# Patient Record
Sex: FEMALE | Race: WHITE
[De-identification: names, ages, dates, MRNs, and addresses within clinical notes are randomized per-mention and may not be internally consistent; named-entity substitution may affect disease eponyms.]

---

## 2023-05-10 ENCOUNTER — APPOINTMENT (OUTPATIENT)
Dept: OBGYN | Facility: CLINIC | Age: 23
End: 2023-05-10
Payer: MEDICAID

## 2023-05-10 VITALS — WEIGHT: 145 LBS | TEMPERATURE: 97.8 F | HEIGHT: 67 IN | BODY MASS INDEX: 22.76 KG/M2

## 2023-05-10 DIAGNOSIS — N92.6 IRREGULAR MENSTRUATION, UNSPECIFIED: ICD-10-CM

## 2023-05-10 PROBLEM — Z00.00 ENCOUNTER FOR PREVENTIVE HEALTH EXAMINATION: Status: ACTIVE | Noted: 2023-05-10

## 2023-05-10 PROCEDURE — 76830 TRANSVAGINAL US NON-OB: CPT

## 2023-05-10 PROCEDURE — 81025 URINE PREGNANCY TEST: CPT

## 2023-05-10 PROCEDURE — 99204 OFFICE O/P NEW MOD 45 MIN: CPT

## 2023-05-13 PROBLEM — N92.6 IRREGULAR MENSES: Status: ACTIVE | Noted: 2023-05-13

## 2023-06-23 ENCOUNTER — NON-APPOINTMENT (OUTPATIENT)
Age: 23
End: 2023-06-23

## 2023-06-27 ENCOUNTER — NON-APPOINTMENT (OUTPATIENT)
Age: 23
End: 2023-06-27

## 2023-06-27 ENCOUNTER — APPOINTMENT (OUTPATIENT)
Dept: OBGYN | Facility: CLINIC | Age: 23
End: 2023-06-27
Payer: MEDICAID

## 2023-06-27 VITALS — TEMPERATURE: 98.2 F | WEIGHT: 145 LBS | HEIGHT: 67 IN | BODY MASS INDEX: 22.76 KG/M2

## 2023-06-27 PROCEDURE — 76830 TRANSVAGINAL US NON-OB: CPT

## 2023-06-27 PROCEDURE — 81025 URINE PREGNANCY TEST: CPT

## 2023-06-27 PROCEDURE — 99213 OFFICE O/P EST LOW 20 MIN: CPT

## 2023-06-29 ENCOUNTER — NON-APPOINTMENT (OUTPATIENT)
Age: 23
End: 2023-06-29

## 2023-07-10 ENCOUNTER — NON-APPOINTMENT (OUTPATIENT)
Age: 23
End: 2023-07-10

## 2023-07-13 ENCOUNTER — APPOINTMENT (OUTPATIENT)
Dept: OBGYN | Facility: CLINIC | Age: 23
End: 2023-07-13
Payer: MEDICAID

## 2023-07-13 VITALS — BODY MASS INDEX: 22.76 KG/M2 | HEIGHT: 67 IN | WEIGHT: 145 LBS | TEMPERATURE: 98.2 F

## 2023-07-13 VITALS — WEIGHT: 140 LBS | BODY MASS INDEX: 27.48 KG/M2 | TEMPERATURE: 98 F | HEIGHT: 60 IN

## 2023-07-13 DIAGNOSIS — N91.1 SECONDARY AMENORRHEA: ICD-10-CM

## 2023-07-13 PROCEDURE — 99212 OFFICE O/P EST SF 10 MIN: CPT

## 2023-07-13 PROCEDURE — 76830 TRANSVAGINAL US NON-OB: CPT

## 2023-08-10 ENCOUNTER — APPOINTMENT (OUTPATIENT)
Dept: OBGYN | Facility: CLINIC | Age: 23
End: 2023-08-10

## 2024-02-28 ENCOUNTER — INPATIENT (INPATIENT)
Facility: HOSPITAL | Age: 24
LOS: 1 days | Discharge: HOME | DRG: 560 | End: 2024-03-01
Attending: OBSTETRICS & GYNECOLOGY | Admitting: OBSTETRICS & GYNECOLOGY
Payer: MEDICAID

## 2024-02-28 VITALS — DIASTOLIC BLOOD PRESSURE: 85 MMHG | HEART RATE: 101 BPM | SYSTOLIC BLOOD PRESSURE: 133 MMHG

## 2024-02-28 DIAGNOSIS — O26.899 OTHER SPECIFIED PREGNANCY RELATED CONDITIONS, UNSPECIFIED TRIMESTER: ICD-10-CM

## 2024-02-28 LAB
BASOPHILS # BLD AUTO: 0.03 K/UL — SIGNIFICANT CHANGE UP (ref 0–0.2)
BASOPHILS NFR BLD AUTO: 0.2 % — SIGNIFICANT CHANGE UP (ref 0–1)
EOSINOPHIL # BLD AUTO: 0.02 K/UL — SIGNIFICANT CHANGE UP (ref 0–0.7)
EOSINOPHIL NFR BLD AUTO: 0.2 % — SIGNIFICANT CHANGE UP (ref 0–8)
HCT VFR BLD CALC: 38.2 % — SIGNIFICANT CHANGE UP (ref 37–47)
HGB BLD-MCNC: 13.2 G/DL — SIGNIFICANT CHANGE UP (ref 12–16)
IMM GRANULOCYTES NFR BLD AUTO: 0.9 % — HIGH (ref 0.1–0.3)
LYMPHOCYTES # BLD AUTO: 1.7 K/UL — SIGNIFICANT CHANGE UP (ref 1.2–3.4)
LYMPHOCYTES # BLD AUTO: 13.4 % — LOW (ref 20.5–51.1)
MCHC RBC-ENTMCNC: 31.9 PG — HIGH (ref 27–31)
MCHC RBC-ENTMCNC: 34.6 G/DL — SIGNIFICANT CHANGE UP (ref 32–37)
MCV RBC AUTO: 92.3 FL — SIGNIFICANT CHANGE UP (ref 81–99)
MONOCYTES # BLD AUTO: 0.71 K/UL — HIGH (ref 0.1–0.6)
MONOCYTES NFR BLD AUTO: 5.6 % — SIGNIFICANT CHANGE UP (ref 1.7–9.3)
NEUTROPHILS # BLD AUTO: 10.09 K/UL — HIGH (ref 1.4–6.5)
NEUTROPHILS NFR BLD AUTO: 79.7 % — HIGH (ref 42.2–75.2)
NRBC # BLD: 0 /100 WBCS — SIGNIFICANT CHANGE UP (ref 0–0)
PLATELET # BLD AUTO: 172 K/UL — SIGNIFICANT CHANGE UP (ref 130–400)
PMV BLD: 12.8 FL — HIGH (ref 7.4–10.4)
PRENATAL SYPHILIS TEST: SIGNIFICANT CHANGE UP
RBC # BLD: 4.14 M/UL — LOW (ref 4.2–5.4)
RBC # FLD: 13.1 % — SIGNIFICANT CHANGE UP (ref 11.5–14.5)
WBC # BLD: 12.67 K/UL — HIGH (ref 4.8–10.8)
WBC # FLD AUTO: 12.67 K/UL — HIGH (ref 4.8–10.8)

## 2024-02-28 PROCEDURE — 36415 COLL VENOUS BLD VENIPUNCTURE: CPT

## 2024-02-28 PROCEDURE — 59050 FETAL MONITOR W/REPORT: CPT

## 2024-02-28 PROCEDURE — 86592 SYPHILIS TEST NON-TREP QUAL: CPT

## 2024-02-28 PROCEDURE — 85025 COMPLETE CBC W/AUTO DIFF WBC: CPT

## 2024-02-28 PROCEDURE — 86850 RBC ANTIBODY SCREEN: CPT

## 2024-02-28 PROCEDURE — 86900 BLOOD TYPING SEROLOGIC ABO: CPT

## 2024-02-28 PROCEDURE — 86901 BLOOD TYPING SEROLOGIC RH(D): CPT

## 2024-02-28 RX ORDER — OXYTOCIN 10 UNIT/ML
333.33 VIAL (ML) INJECTION
Qty: 20 | Refills: 0 | Status: DISCONTINUED | OUTPATIENT
Start: 2024-02-28 | End: 2024-03-01

## 2024-02-28 RX ORDER — OXYTOCIN 10 UNIT/ML
2 VIAL (ML) INJECTION
Qty: 30 | Refills: 0 | Status: DISCONTINUED | OUTPATIENT
Start: 2024-02-28 | End: 2024-03-01

## 2024-02-28 RX ORDER — SODIUM CHLORIDE 9 MG/ML
1000 INJECTION, SOLUTION INTRAVENOUS
Refills: 0 | Status: DISCONTINUED | OUTPATIENT
Start: 2024-02-28 | End: 2024-02-29

## 2024-02-28 RX ORDER — INFLUENZA VIRUS VACCINE 15; 15; 15; 15 UG/.5ML; UG/.5ML; UG/.5ML; UG/.5ML
0.5 SUSPENSION INTRAMUSCULAR ONCE
Refills: 0 | Status: COMPLETED | OUTPATIENT
Start: 2024-02-28 | End: 2024-02-28

## 2024-02-28 RX ADMIN — Medication 2 MILLIUNIT(S)/MIN: at 16:59

## 2024-02-28 NOTE — OB PROVIDER H&P - HISTORY OF PRESENT ILLNESS
23y  @ 39weeks 6 days ROOPA: 24 consistent with early sono, presents for PROM. Patient states she felt gush of fluid at 1am clear. Pt admits vaginal leaking throughout the day. Pt states her panty was soaked in triage.  Denies VB, LOF, and ctx. +FM. No complications with this pregnancy. Denies any hx of HTN or GDM. Denies headaches, visual changes, nausea, vomiting, or RUQ pain. Denies hx of PPH or blood transfusion. GBS (neg).

## 2024-02-28 NOTE — OB PROVIDER H&P - NSHPPHYSICALEXAM_GEN_ALL_CORE
T(C): 36.5 (02-28-24 @ 14:20), Max: 36.5 (02-28-24 @ 14:00)  HR: 101 (02-28-24 @ 14:20) (101 - 113)  BP: 133/85 (02-28-24 @ 14:20) (133/85 - 133/85)  RR: 18 (02-28-24 @ 14:20) (18 - 18)      EFM: 145 bpm, moderate variability, +accels  TOCO: irreg ctxs    Abd: soft, gravid, nontender

## 2024-02-28 NOTE — OB PROVIDER H&P - NSHPLABSRESULTS_GEN_ALL_CORE
1st trimester labs  7/19/23  HIV :neg  RPR: neg  Varicella: imm  Measles: imm  Rubella: non imm  Hep B: neg  Rh: A pos  antibody: neg    3rd trimester: 2/7/24  HIV: neg   GBS: neg

## 2024-02-28 NOTE — OB PROVIDER H&P - ASSESSMENT
23y  @ 39weeks 6 days, GBS neg, admitted for PROM clear since 1am    -Admit to L & D:  -IV hydration  -Admission labs  -Continuous EFM & TOCO monitoring  -Clear Liquid diet  -MMR PP  -Pain Management PRN  -IOL w/Pitocin    Dr. Sanders aware

## 2024-02-28 NOTE — OB PROVIDER H&P - ATTENDING COMMENTS
24yo  at 39w6d gestation who presented to L&D with SROM  SROM at 0100 on 24  Not feeling contractions  GBS negative  Admit to L&D  Pitocin for inductions/augmentation of labor  Epidural if desired for pain control  Anticipate vaginal delivery

## 2024-02-29 LAB
BASOPHILS # BLD AUTO: 0.04 K/UL — SIGNIFICANT CHANGE UP (ref 0–0.2)
BASOPHILS NFR BLD AUTO: 0.2 % — SIGNIFICANT CHANGE UP (ref 0–1)
EOSINOPHIL # BLD AUTO: 0.01 K/UL — SIGNIFICANT CHANGE UP (ref 0–0.7)
EOSINOPHIL NFR BLD AUTO: 0 % — SIGNIFICANT CHANGE UP (ref 0–8)
HCT VFR BLD CALC: 34.6 % — LOW (ref 37–47)
HGB BLD-MCNC: 11.8 G/DL — LOW (ref 12–16)
IMM GRANULOCYTES NFR BLD AUTO: 0.8 % — HIGH (ref 0.1–0.3)
LYMPHOCYTES # BLD AUTO: 1.59 K/UL — SIGNIFICANT CHANGE UP (ref 1.2–3.4)
LYMPHOCYTES # BLD AUTO: 7.7 % — LOW (ref 20.5–51.1)
MCHC RBC-ENTMCNC: 31.3 PG — HIGH (ref 27–31)
MCHC RBC-ENTMCNC: 34.1 G/DL — SIGNIFICANT CHANGE UP (ref 32–37)
MCV RBC AUTO: 91.8 FL — SIGNIFICANT CHANGE UP (ref 81–99)
MONOCYTES # BLD AUTO: 1.26 K/UL — HIGH (ref 0.1–0.6)
MONOCYTES NFR BLD AUTO: 6.1 % — SIGNIFICANT CHANGE UP (ref 1.7–9.3)
NEUTROPHILS # BLD AUTO: 17.52 K/UL — HIGH (ref 1.4–6.5)
NEUTROPHILS NFR BLD AUTO: 85.2 % — HIGH (ref 42.2–75.2)
NRBC # BLD: 0 /100 WBCS — SIGNIFICANT CHANGE UP (ref 0–0)
PLATELET # BLD AUTO: 157 K/UL — SIGNIFICANT CHANGE UP (ref 130–400)
PMV BLD: 12.6 FL — HIGH (ref 7.4–10.4)
RBC # BLD: 3.77 M/UL — LOW (ref 4.2–5.4)
RBC # FLD: 13 % — SIGNIFICANT CHANGE UP (ref 11.5–14.5)
WBC # BLD: 20.58 K/UL — HIGH (ref 4.8–10.8)
WBC # FLD AUTO: 20.58 K/UL — HIGH (ref 4.8–10.8)

## 2024-02-29 RX ORDER — BENZOCAINE 10 %
1 GEL (GRAM) MUCOUS MEMBRANE EVERY 6 HOURS
Refills: 0 | Status: DISCONTINUED | OUTPATIENT
Start: 2024-02-29 | End: 2024-03-01

## 2024-02-29 RX ORDER — OXYCODONE HYDROCHLORIDE 5 MG/1
5 TABLET ORAL ONCE
Refills: 0 | Status: DISCONTINUED | OUTPATIENT
Start: 2024-02-29 | End: 2024-03-01

## 2024-02-29 RX ORDER — MAGNESIUM HYDROXIDE 400 MG/1
30 TABLET, CHEWABLE ORAL
Refills: 0 | Status: DISCONTINUED | OUTPATIENT
Start: 2024-02-29 | End: 2024-03-01

## 2024-02-29 RX ORDER — IBUPROFEN 200 MG
600 TABLET ORAL EVERY 6 HOURS
Refills: 0 | Status: DISCONTINUED | OUTPATIENT
Start: 2024-02-29 | End: 2024-03-01

## 2024-02-29 RX ORDER — DIPHENHYDRAMINE HCL 50 MG
25 CAPSULE ORAL EVERY 6 HOURS
Refills: 0 | Status: DISCONTINUED | OUTPATIENT
Start: 2024-02-29 | End: 2024-03-01

## 2024-02-29 RX ORDER — DIBUCAINE 1 %
1 OINTMENT (GRAM) RECTAL EVERY 6 HOURS
Refills: 0 | Status: DISCONTINUED | OUTPATIENT
Start: 2024-02-29 | End: 2024-03-01

## 2024-02-29 RX ORDER — OXYTOCIN 10 UNIT/ML
41.67 VIAL (ML) INJECTION
Qty: 20 | Refills: 0 | Status: DISCONTINUED | OUTPATIENT
Start: 2024-02-29 | End: 2024-03-01

## 2024-02-29 RX ORDER — SIMETHICONE 80 MG/1
80 TABLET, CHEWABLE ORAL EVERY 4 HOURS
Refills: 0 | Status: DISCONTINUED | OUTPATIENT
Start: 2024-02-29 | End: 2024-03-01

## 2024-02-29 RX ORDER — KETOROLAC TROMETHAMINE 30 MG/ML
30 SYRINGE (ML) INJECTION ONCE
Refills: 0 | Status: DISCONTINUED | OUTPATIENT
Start: 2024-02-29 | End: 2024-02-29

## 2024-02-29 RX ORDER — SODIUM CHLORIDE 9 MG/ML
3 INJECTION INTRAMUSCULAR; INTRAVENOUS; SUBCUTANEOUS EVERY 8 HOURS
Refills: 0 | Status: DISCONTINUED | OUTPATIENT
Start: 2024-02-29 | End: 2024-03-01

## 2024-02-29 RX ORDER — PRAMOXINE HYDROCHLORIDE 150 MG/15G
1 AEROSOL, FOAM RECTAL EVERY 4 HOURS
Refills: 0 | Status: DISCONTINUED | OUTPATIENT
Start: 2024-02-29 | End: 2024-03-01

## 2024-02-29 RX ORDER — TETANUS TOXOID, REDUCED DIPHTHERIA TOXOID AND ACELLULAR PERTUSSIS VACCINE, ADSORBED 5; 2.5; 8; 8; 2.5 [IU]/.5ML; [IU]/.5ML; UG/.5ML; UG/.5ML; UG/.5ML
0.5 SUSPENSION INTRAMUSCULAR ONCE
Refills: 0 | Status: DISCONTINUED | OUTPATIENT
Start: 2024-02-29 | End: 2024-03-01

## 2024-02-29 RX ORDER — HYDROCORTISONE 1 %
1 OINTMENT (GRAM) TOPICAL EVERY 6 HOURS
Refills: 0 | Status: DISCONTINUED | OUTPATIENT
Start: 2024-02-29 | End: 2024-03-01

## 2024-02-29 RX ORDER — LANOLIN
1 OINTMENT (GRAM) TOPICAL EVERY 6 HOURS
Refills: 0 | Status: DISCONTINUED | OUTPATIENT
Start: 2024-02-29 | End: 2024-03-01

## 2024-02-29 RX ORDER — OXYCODONE HYDROCHLORIDE 5 MG/1
5 TABLET ORAL
Refills: 0 | Status: DISCONTINUED | OUTPATIENT
Start: 2024-02-29 | End: 2024-03-01

## 2024-02-29 RX ORDER — AER TRAVELER 0.5 G/1
1 SOLUTION RECTAL; TOPICAL EVERY 4 HOURS
Refills: 0 | Status: DISCONTINUED | OUTPATIENT
Start: 2024-02-29 | End: 2024-03-01

## 2024-02-29 RX ORDER — IBUPROFEN 200 MG
600 TABLET ORAL EVERY 6 HOURS
Refills: 0 | Status: COMPLETED | OUTPATIENT
Start: 2024-02-29 | End: 2025-01-27

## 2024-02-29 RX ORDER — ACETAMINOPHEN 500 MG
975 TABLET ORAL
Refills: 0 | Status: DISCONTINUED | OUTPATIENT
Start: 2024-02-29 | End: 2024-03-01

## 2024-02-29 RX ADMIN — Medication 975 MILLIGRAM(S): at 17:41

## 2024-02-29 RX ADMIN — Medication 975 MILLIGRAM(S): at 16:41

## 2024-02-29 RX ADMIN — SODIUM CHLORIDE 3 MILLILITER(S): 9 INJECTION INTRAMUSCULAR; INTRAVENOUS; SUBCUTANEOUS at 07:30

## 2024-02-29 RX ADMIN — SODIUM CHLORIDE 3 MILLILITER(S): 9 INJECTION INTRAMUSCULAR; INTRAVENOUS; SUBCUTANEOUS at 16:24

## 2024-02-29 RX ADMIN — Medication 1 TABLET(S): at 11:11

## 2024-02-29 RX ADMIN — Medication 125 MILLIUNIT(S)/MIN: at 05:21

## 2024-02-29 RX ADMIN — Medication 975 MILLIGRAM(S): at 11:11

## 2024-02-29 RX ADMIN — Medication 975 MILLIGRAM(S): at 12:30

## 2024-02-29 RX ADMIN — SODIUM CHLORIDE 3 MILLILITER(S): 9 INJECTION INTRAMUSCULAR; INTRAVENOUS; SUBCUTANEOUS at 23:53

## 2024-02-29 NOTE — OB PROVIDER DELIVERY SUMMARY - NSSELHIDDEN_OBGYN_ALL_OB_FT
[NS_DeliveryAttending1_OBGYN_ALL_OB_FT:[id number]=],[NS_DeliveryRN_OBGYN_ALL_OB_FT:[id number]=],[NS_CirculateRN2_OBGYN_ALL_OB_FT:[id number]=]

## 2024-02-29 NOTE — OB PROVIDER DELIVERY SUMMARY - NSLOWPPHRISK_OBGYN_A_OB
No previous uterine incision/Thompson Pregnancy/Less than or equal to 4 previous vaginal births/No known bleeding disorder/No history of postpartum hemorrhage

## 2024-02-29 NOTE — OB PROVIDER DELIVERY SUMMARY - NSPROVIDERDELIVERYNOTE_OBGYN_ALL_OB_FT
Normal spontaneous vaginal delivery of female infant weighing 3390g with Apgars of 9 and 9 on 2024 at 0502. Head delivered in OA position with maternal pushing effort and restituted to LOT. Loose nuchal cord noted x1 noted with compound left arm. Anterior/posterior shoulders delivered without complication followed by the remainder of the body.  placed on maternal abdomen, stimulated and mouth/nose bulb suctioned. Cord clamped and cut after about a one minute delay. Cord segment and blood sample collected. Postpartum Pitocin initiated. Placenta delivered spontaneously at 0520, appeared intact. Fundus firm. Inspection of cervix, vagina and perineum demonstrated a second degree laceration which was repaired with 2-0 Chromic in the usual manner. Good hemostasis. Patient tolerated procedure well.

## 2024-02-29 NOTE — PRE-ANESTHESIA EVALUATION ADULT - NSANTHADDINFOFT_GEN_ALL_CORE
00.25am Patient evaluated prior to procedure.Consent taken.
Patient evaluated prior to procedure.Consent taken.All questions answered.

## 2024-02-29 NOTE — OB RN DELIVERY SUMMARY - NSSELHIDDEN_OBGYN_ALL_OB_FT
[NS_DeliveryAttending1_OBGYN_ALL_OB_FT:[id number]=],[NS_DeliveryRN_OBGYN_ALL_OB_FT:[id number]=],[NS_CirculateRN2_OBGYN_ALL_OB_FT:[id number]=]

## 2024-03-01 ENCOUNTER — TRANSCRIPTION ENCOUNTER (OUTPATIENT)
Age: 24
End: 2024-03-01

## 2024-03-01 VITALS
DIASTOLIC BLOOD PRESSURE: 60 MMHG | SYSTOLIC BLOOD PRESSURE: 105 MMHG | TEMPERATURE: 98 F | HEART RATE: 68 BPM | RESPIRATION RATE: 18 BRPM

## 2024-03-01 RX ORDER — ACETAMINOPHEN 500 MG
3 TABLET ORAL
Qty: 0 | Refills: 0 | DISCHARGE
Start: 2024-03-01

## 2024-03-01 RX ORDER — IBUPROFEN 200 MG
1 TABLET ORAL
Qty: 0 | Refills: 0 | DISCHARGE
Start: 2024-03-01

## 2024-03-01 RX ADMIN — Medication 600 MILLIGRAM(S): at 13:34

## 2024-03-01 RX ADMIN — Medication 600 MILLIGRAM(S): at 13:33

## 2024-03-01 RX ADMIN — Medication 600 MILLIGRAM(S): at 00:15

## 2024-03-01 RX ADMIN — Medication 1 TABLET(S): at 13:32

## 2024-03-01 RX ADMIN — SODIUM CHLORIDE 3 MILLILITER(S): 9 INJECTION INTRAMUSCULAR; INTRAVENOUS; SUBCUTANEOUS at 05:59

## 2024-03-01 NOTE — DISCHARGE NOTE OB - MEDICATION SUMMARY - MEDICATIONS TO TAKE
I will START or STAY ON the medications listed below when I get home from the hospital:    ibuprofen 600 mg oral tablet  -- 1 tab(s) by mouth every 6 hours as needed for  moderate pain  -- Indication: For vaginal delivery    acetaminophen 325 mg oral tablet  -- 3 tab(s) by mouth every 6 hours as needed for  moderate pain  -- Indication: For vaginal delivery    Prenatal Multivitamins with Folic Acid 1 mg oral tablet  -- 1 tab(s) by mouth once a day  -- Indication: For vaginal delivery

## 2024-03-01 NOTE — DISCHARGE NOTE OB - CARE PLAN
Principal Discharge DX:	Vaginal delivery  Assessment and plan of treatment:	Routine postpartum care  Tylenol 1000 mg, ibuprofen 600 mg q6h PRN pain  Regular diet  Encourage PO hydration, ambulation, breastfeeding  Continue PNV   1

## 2024-03-01 NOTE — DISCHARGE NOTE OB - MATERIALS PROVIDED
Vaccinations/[facility]  Screening Program/  Immunization Record/Breastfeeding Log/Breastfeeding Mother’s Support Group Information/Guide to Postpartum Care/[facility] Hearing Screen Program/Back To Sleep Handout/Shaken Baby Prevention Handout/Breastfeeding Guide and Packet/Birth Certificate Instructions

## 2024-03-01 NOTE — DISCHARGE NOTE OB - ADDITIONAL INSTRUCTIONS
Activities: Gradually resume normal activities and avoid over exertion. Continue to ambulate regularly while at home. Observe strict pelvic rest for 6 weeks following delivery. Patient may shower. Avoid tub baths for 6 weeks after delivery.     Recommendations: Call the office for a temperature greater than or equal to 100.4 F, severe uncontrolled pain, vaginal bleeding saturating two menstrual pads per hour for greater than two hours in a row, headache, changes in vision, upper abdominal pain, difficulty breathing, new or worsening swelling, or any other concerns.

## 2024-03-01 NOTE — DISCHARGE NOTE OB - CARE PROVIDER_API CALL
Sera Chow  Obstetrics and Gynecology  [address] [place] [address], [address]  [place] [address]  Phone: [phone]  Fax: [phone]  Follow Up Time:

## 2024-03-01 NOTE — DISCHARGE NOTE OB - HOSPITAL COURSE
Patient was admitted after PROM. She had uncomplicated labor course and normal vaginal delivery. See delivery note for details. Postpartum course was uncomplicated. She met postpartum milestones by PPD1, and was discharged to home with return precautions.

## 2024-03-01 NOTE — DISCHARGE NOTE OB - PLAN OF CARE
Routine postpartum care  Tylenol 1000 mg, ibuprofen 600 mg q6h PRN pain  Regular diet  Encourage PO hydration, ambulation, breastfeeding  Continue PNV

## 2024-03-01 NOTE — PROGRESS NOTE ADULT - ASSESSMENT
A/P: 23y now P1, s/p primary LTCS for arrest of descent, , recovering well     - pain management with PO pain meds   - monitor vitals/bleeding   - encourage incentive spirometry   - ambulation/PO hydration  - advance diet as tolerated   - simethicone  - SCDs/lovenox for DVT prophylaxis   - f/u 1100 labs   - routine postop care     to be d/w Dr. Cifuentes and Dr. Peralta 
Assessment:    24 yo  s/p  at 40 0/7 wga, doing well on PPD1  2. GBS neg  3. Rh pos    Plan:  -Routine postpartum care  -Tylenol, ibuprofen PRN for pain  -Regular diet  -Encourage ambulation, PO hydration  -Encourage breastfeeding  -Anticipate discharge home today
(2) more than 100 beats/min

## 2024-03-01 NOTE — DISCHARGE NOTE OB - PATIENT PORTAL LINK FT
You can access the FollowMyHealth Patient Portal offered by [facility] by registering at the following website: [email or web address]. By joining [facility]’s FollowMyHealth portal, you will also be able to view your health information using other applications (apps) compatible with our system.

## 2024-03-01 NOTE — DISCHARGE NOTE OB - NS MD DC FALL RISK RISK
For information on Fall & Injury Prevention, visit: [email or web address] OR  [email or web address] OR  https://www.cdc.gov/steadi/patient.html

## 2024-03-01 NOTE — PROGRESS NOTE ADULT - SUBJECTIVE AND OBJECTIVE BOX
PGY2 Note:  Section    Pt seen and evaluated at bedside. Pain well controlled. Denies dizziness/lightheadedness/CP/SOB/palpitations. Denies fever, chills, nausea/vomiting, diarrhea, dysuria, LE pain.     Ambulating: No  Voiding: Chris in place  Flatus: No  Bowel movements: No  Breast or bottle feeding: both  Diet: tolerating liquids     Physical Exam  Vital Signs Last 24 Hrs  T(C): 36.8 (2024 01:09), Max: 36.8 (2024 21:53)  T(F): 98.24 (2024 21:53), Max: 98.24 (2024 21:53)  HR: 130 (2024 05:24) (63 - 167)  BP: 125/69 (2024 04:03) (94/53 - 133/85)  RR: 18 (2024 01:09) (18 - 18)  SpO2: 97% (2024 05:24) (97% - 100%)      Gen: AAOx3, NAD  Heart: RRR, S1S2+  Lungs: CTA B/L, no r/r/w   Fundus: firm, below umbilicus   Wound: abdominal dressing in place, small area on R lower corner with serosanguinous drainage, outlined with no expansion   Abd: Soft, nontender, nondistended, BS+  Lochia: minimal   Ext: No calf tenderness, no swelling    Labs:                        13.2   12.67 )-----------( 172      ( 2024 14:52 )             38.2        MEDICATIONS  (STANDING):  acetaminophen     Tablet .. 975 milliGRAM(s) Oral <User Schedule>  diphtheria/tetanus/pertussis (acellular) Vaccine (Adacel) 0.5 milliLiter(s) IntraMuscular once  ibuprofen  Tablet. 600 milliGRAM(s) Oral every 6 hours  influenza   Vaccine 0.5 milliLiter(s) IntraMuscular once  ketorolac   Injectable 30 milliGRAM(s) IV Push once  measles/mumps/rubella Vaccine 0.5 milliLiter(s) SubCutaneous once  oxytocin Infusion 333.333 milliUNIT(s)/Min (1000 mL/Hr) IV Continuous <Continuous>  oxytocin Infusion 41.667 milliUNIT(s)/Min (125 mL/Hr) IV Continuous <Continuous>  oxytocin Infusion. 2 milliUNIT(s)/Min (2 mL/Hr) IV Continuous <Continuous>  prenatal multivitamin 1 Tablet(s) Oral daily  sodium chloride 0.9% lock flush 3 milliLiter(s) IV Push every 8 hours    MEDICATIONS  (PRN):  benzocaine 20%/menthol 0.5% Spray 1 Spray(s) Topical every 6 hours PRN for Perineal discomfort  dibucaine 1% Ointment 1 Application(s) Topical every 6 hours PRN Perineal discomfort  diphenhydrAMINE 25 milliGRAM(s) Oral every 6 hours PRN Pruritus  hydrocortisone 1% Cream 1 Application(s) Topical every 6 hours PRN Moderate Pain (4-6)  lanolin Ointment 1 Application(s) Topical every 6 hours PRN nipple soreness  magnesium hydroxide Suspension 30 milliLiter(s) Oral two times a day PRN Constipation  oxyCODONE    IR 5 milliGRAM(s) Oral every 3 hours PRN Moderate to Severe Pain (4-10)  oxyCODONE    IR 5 milliGRAM(s) Oral once PRN Moderate to Severe Pain (4-10)  pramoxine 1%/zinc 5% Cream 1 Application(s) Topical every 4 hours PRN Moderate Pain (4-6)  simethicone 80 milliGRAM(s) Chew every 4 hours PRN Gas  witch hazel Pads 1 Application(s) Topical every 4 hours PRN Perineal discomfort      
Pain controlled. Tolerating PO. Ambulating, voiding without difficulty. Lochia moderate. Breastfeeding.       T(C): 36.6 (03-01-24 @ 07:47), Max: 36.8 (02-29-24 @ 17:29)  HR: 68 (03-01-24 @ 07:47) (68 - 133)  BP: 105/60 (03-01-24 @ 07:47) (104/64 - 120/74)  RR: 18 (03-01-24 @ 07:47) (18 - 20)  SpO2: --    Exam:   Gen: NAD  HEENT: NCAT  CV: RRR  Resp: CTAB  Abdo: soft, ND/NT, fundus firm and below the umbilicus, non-tender  Pelvic: moderatelochia  Extremities: trace pedal edema, no calf tenderness  Neuro: alert, oriented                          11.8   20.58 )-----------( 173      ( 29 Feb 2024 09:48 )             34.6     MEDICATIONS  (STANDING):  acetaminophen     Tablet .. 975 milliGRAM(s) Oral <User Schedule>  diphtheria/tetanus/pertussis (acellular) Vaccine (Adacel) 0.5 milliLiter(s) IntraMuscular once  ibuprofen  Tablet. 600 milliGRAM(s) Oral every 6 hours  influenza   Vaccine 0.5 milliLiter(s) IntraMuscular once  measles/mumps/rubella Vaccine 0.5 milliLiter(s) SubCutaneous once  oxytocin Infusion 333.333 milliUNIT(s)/Min (1000 mL/Hr) IV Continuous <Continuous>  oxytocin Infusion 41.667 milliUNIT(s)/Min (125 mL/Hr) IV Continuous <Continuous>  oxytocin Infusion. 2 milliUNIT(s)/Min (2 mL/Hr) IV Continuous <Continuous>  prenatal multivitamin 1 Tablet(s) Oral daily  sodium chloride 0.9% lock flush 3 milliLiter(s) IV Push every 8 hours    MEDICATIONS  (PRN):  benzocaine 20%/menthol 0.5% Spray 1 Spray(s) Topical every 6 hours PRN for Perineal discomfort  dibucaine 1% Ointment 1 Application(s) Topical every 6 hours PRN Perineal discomfort  diphenhydrAMINE 25 milliGRAM(s) Oral every 6 hours PRN Pruritus  hydrocortisone 1% Cream 1 Application(s) Topical every 6 hours PRN Moderate Pain (4-6)  lanolin Ointment 1 Application(s) Topical every 6 hours PRN nipple soreness  magnesium hydroxide Suspension 30 milliLiter(s) Oral two times a day PRN Constipation  oxyCODONE    IR 5 milliGRAM(s) Oral every 3 hours PRN Moderate to Severe Pain (4-10)  oxyCODONE    IR 5 milliGRAM(s) Oral once PRN Moderate to Severe Pain (4-10)  pramoxine 1%/zinc 5% Cream 1 Application(s) Topical every 4 hours PRN Moderate Pain (4-6)  simethicone 80 milliGRAM(s) Chew every 4 hours PRN Gas  witch hazel Pads 1 Application(s) Topical every 4 hours PRN Perineal discomfort

## 2024-03-01 NOTE — DISCHARGE NOTE OB - PRINCIPAL DIAGNOSIS
Problem: Potential for Falls  Goal: Patient will remain free of falls  Description: INTERVENTIONS:  - Educate patient/family on patient safety including physical limitations  - Instruct patient to call for assistance with activity   - Consult OT/PT to assist with strengthening/mobility   - Keep Call bell within reach  - Keep bed low and locked with side rails adjusted as appropriate  - Keep care items and personal belongings within reach  - Initiate and maintain comfort rounds  - Make Fall Risk Sign visible to staff  - Offer Toileting every Hours, in advance of need  - Initiate/Maintain alarm  - Obtain necessary fall risk management equipment:   - Apply yellow socks and bracelet for high fall risk patients  - Consider moving patient to room near nurses station  Outcome: Progressing Vaginal delivery

## 2024-03-15 DIAGNOSIS — Z3A.40 40 WEEKS GESTATION OF PREGNANCY: ICD-10-CM

## 2024-04-03 NOTE — OB RN DELIVERY SUMMARY - NSBEGANLABOR_OBGYN_A_OB
4/3/2024    Deb Cárdenas  [address] [facility] [address]      Dear Deb,    There’s no question about it - preventive care can save lives. Many health problems start out silently without symptoms. Preventive care is often the only way to catch these problems in early stages, when they can be more successfully treated.     Our records show that you are due for the screening(s) listed below. If you have completed these screening(s), please call us so we can update your record.    Screening Mammogram  Mammogram: A mammogram can find cancer long before a lump can be felt on the breast. To schedule your Mammogram, please call [phone].  Screening Colonoscopy   Colonoscopy: Colorectal cancer usually comes from polyps (abnormal growths) in the colon or rectum. Colonoscopy can find the polyps and remove them before they turn to cancer. To schedule your Colonoscopy or to discuss other screening options, call [phone].    Your health is important to us. Please feel free to call my office at [phone] or make an appointment to discuss the best screening options for you.     Sincerely,      Benita Reeves MD   [facility] [address] [facility] [address]  Dept: [phone]  Dept Fax: [phone]         [facility] offers online access to your health information via [email or web address]       
While in Labor & Delivery

## 2024-11-20 NOTE — OB RN TRIAGE NOTE - NS_SISCREENINGSR_GEN_ALL_ED
-----------------------------  [employer] Clarity  -----------------------------  Zenia Frankie  YOB: 1964  Generated at: Wed, Nov 20, 2024 10:42 AM CST  Reporting period: Tue Oct 22, 2024 - Wed Nov 20, 2024  -----------------------------  Glucose Details  Average glucose: 170 mg/dL  Standard deviation: 49 mg/dL  -----------------------------  Time in Range  Very High: 8%  High: 28%  In Range: 64%  Low: 0%  Very Low: 0%  Target Range   mg/dL    -----------------------------  CGM Details  Sensor usage: 97%  Days with CGM data: 29/30     Negative